# Patient Record
Sex: FEMALE | Race: WHITE
[De-identification: names, ages, dates, MRNs, and addresses within clinical notes are randomized per-mention and may not be internally consistent; named-entity substitution may affect disease eponyms.]

---

## 2021-04-08 ENCOUNTER — HOSPITAL ENCOUNTER (EMERGENCY)
Dept: HOSPITAL 50 - VM.ED | Age: 66
Discharge: HOME | End: 2021-04-08
Payer: MEDICARE

## 2021-04-08 VITALS — DIASTOLIC BLOOD PRESSURE: 84 MMHG | HEART RATE: 107 BPM | SYSTOLIC BLOOD PRESSURE: 137 MMHG

## 2021-04-08 DIAGNOSIS — Z88.5: ICD-10-CM

## 2021-04-08 DIAGNOSIS — J01.90: Primary | ICD-10-CM

## 2021-04-08 DIAGNOSIS — Z91.013: ICD-10-CM

## 2021-04-08 DIAGNOSIS — Z88.4: ICD-10-CM

## 2021-04-08 DIAGNOSIS — Z88.6: ICD-10-CM

## 2021-04-08 DIAGNOSIS — Z79.899: ICD-10-CM

## 2021-04-08 DIAGNOSIS — Z88.8: ICD-10-CM

## 2021-04-08 DIAGNOSIS — Z20.822: ICD-10-CM

## 2021-04-08 DIAGNOSIS — E66.9: ICD-10-CM

## 2021-04-08 DIAGNOSIS — Z91.048: ICD-10-CM

## 2021-04-08 LAB — SARS-COV-2 RNA RESP QL NAA+PROBE: NEGATIVE

## 2021-04-08 NOTE — EDM.PDOC
ED HPI GENERAL MEDICAL PROBLEM





- General


Chief Complaint: Respiratory Problem


Stated Complaint: Congestion


Time Seen by Provider: 04/08/21 10:54


Source of Information: Reports: Patient





- History of Present Illness


INITIAL COMMENTS - FREE TEXT/NARRATIVE: 





Ronda is a 64 y/o female who was sent to the ER to be seen after she called the 

[facility] and advised them that she has not had any sense of taste or smell

since last Saturday. She has had congestion and a runny nose along wiht a mild 

cough and a sore throat. Over the weekend she did have some abdominal upset and 

diarrhea, but none since then. Also feels a bit chilled and thinks her temp is 

rising. This AM she got a bad headache and eye pressure.





- Related Data


                                    Allergies











Allergy/AdvReac Type Severity Reaction Status Date / Time


 


aloe vera [From Vagisil] Allergy  Other Verified 04/01/21 10:50


 


benzocaine [From Vagisil] Allergy  Other Verified 04/01/21 10:50


 


hydrocodone Allergy  Cannot Verified 04/01/21 10:50





   Remember  


 


ibuprofen [From Motrin] Allergy  Nausea Verified 04/01/21 10:50


 


lactose Allergy  Other Verified 04/01/21 10:50


 


mineral oil [From Vagisil] Allergy  Other Verified 04/01/21 10:50


 


oxycodone Allergy  Other Verified 04/01/21 10:50


 


resorcinol [From Vagisil] Allergy  Other Verified 04/01/21 10:50


 


starch [From Vagisil] Allergy  Other Verified 04/01/21 10:50


 


vitamin E (d-alpha Allergy  Other Verified 04/01/21 10:50





tocopherol)     





[From Vagisil]     


 


vitamins A and D Allergy  Other Verified 04/01/21 10:50





[From Vagisil]     


 


Envirnmental Allergy  Itching Uncoded 04/01/21 10:50


 


green peppers Allergy  Other Uncoded 04/01/21 10:50











Home Meds: 


                                    Home Meds





Acetaminophen [Tylenol Extra Strength] 1 - 2 tab PO Q4H PRN 02/28/17 [History]


Naproxen Sodium [Aleve] 1 - 2 tab PO BID 02/28/17 [History]


Propylene Glycol [Lubricant Eye Drop] 1 drop EYEBOTH Q2H PRN 02/28/17 [History]


Tetrahydroz/Dext 70/Peg 400/Pv [Visine Advanced Eye Drop] 1 - 2 drop EYEBOTH BID

PRN 02/28/17 [History]


Biotin 5 mg PO DAILY 03/02/21 [History]


Cinnamon Bark 1 gm PO DAILY 03/02/21 [History]


Collagenase Clostridium Hist. [Collagenase] 1 each PO DAILY 03/02/21 [History]


Garlic 1 each PO DAILY 03/02/21 [History]


Ginkgo Biloba 40 mg PO DAILY 03/02/21 [History]


Glucosam/Chond/Collagen/Hyalur [Glucosamine Chondroitin] 1 each PO TID 03/02/21 

[History]


Multivitamin 1 each PO DAILY 03/02/21 [History]


Omega-3 Fatty Acids [Maxepa] 500 mg PO DAILY 03/02/21 [History]


Turmeric 1 gm PO DAILY 03/02/21 [History]


Vitamin B Complex 1 each PO DAILY 03/02/21 [History]


Boswellia John Extract 2 gm MC DAILY 03/10/21 [History]


Cefuroxime Axetil [Ceftin] 500 mg PO BID #20 tablet 04/08/21 [Rx]











Past Medical History


HEENT History: Reports: Hard of Hearing, Impaired Vision, Other (See Below)


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


Cardiovascular History: Reports: High Cholesterol


Respiratory History: Reports: None


Gastrointestinal History: Reports: Colon Polyp


Genitourinary History: Reports: Other (See Below)


Other Genitourinary History: vulvar pain


OB/GYN History: Reports: Other (See Below)


Other OB/GYN History: Vulvar pain


Musculoskeletal History: Reports: Osteoarthritis, Other (See Below)


Other Musculoskeletal History: Carpal tunnel syndrome.  Plantar fascial 

fibromatosis.  Dupuytren's disease of palm


Neurological History: Reports: Head Trauma, Other (See Below)


Other Neuro History: Postherpetic neuralgia


Psychiatric History: Reports: None


Endocrine/Metabolic History: Reports: Obesity/BMI 30+


Hematologic History: Reports: Anemia


Oncologic (Cancer) History: Reports: Uterine


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


Dermatologic History: Reports: Other (See Below)





- Past Surgical History


HEENT Surgical History: Reports: Oral Surgery


Cardiovascular Surgical History: Reports: None


Respiratory Surgical History: Reports: None


GI Surgical History: Reports: Colonoscopy


Female  Surgical History: Reports: Hysterectomy, Salpingo-Oophorectomy, Other 

(See Below)


Other Female  Surgeries/Procedures: lap node dissection robotic omentectomy, 

pelvic and zelda aortic lymphadenectomy, hysteroscopy


Musculoskeletal Surgical History: Reports: Carpal Tunnel





Social & Family History





- Family History


Cardiac: Reports: CAD, Hypertension


Other Cardiac Family History: CAD-mother.  HTN-mother, sister


Respiratory: Reports: Asthma, Sleep Apnea


Other Respiratory Family Hisory: asthma-sister.  apnea-mother, sister


Musculoskeletal: Reports: Arthritis, Back pain, Chronic


Other Musculoskeletal Family History: arthritis-mother, sister, brother.  

chronic back pain-father


Neurological: Reports: Migraines, MS


Other Neurological Family History: migraines and MS-sister


Endocrine/Metabolic: Reports: Diabetes, Type I, Obesity/MBI 30+


Other Endocrine/Metabolic Family History: diabetes-mother, sister.  obesity-

mother, sister


Oncologic: Reports: Skin, Uterine


Other Oncologic Family History: skin and uterine - sister





- Caffeine Use


Caffeine Use: Reports: None





- Living Situation & Occupation


Living situation: Reports:  (With 3 stepchildren from the marriage.), 

with Spouse


Occupation: Retired (Patient worked as a  for the [employer].)





ED ROS GENERAL





- Review of Systems


Review Of Systems: See Below


Constitutional: Reports: Chills, Fatigue


HEENT: Reports: No Symptoms, Sinus Problem, Throat Pain, Other (Eye pressure)


Respiratory: Reports: Cough


Cardiovascular: Reports: No Symptoms


Endocrine: Reports: No Symptoms


GI/Abdominal: Reports: Abdominal Pain, Diarrhea (x1), Decreased Appetite


: Reports: No Symptoms


Musculoskeletal: Reports: No Symptoms


Skin: Reports: No Symptoms


Neurological: Reports: Headache


Psychiatric: Reports: No Symptoms


Hematologic/Lymphatic: Reports: No Symptoms


Immunologic: Reports: No Symptoms





ED EXAM, GENERAL





- Physical Exam


Exam: See Below


General Appearance: Alert, WD/WN, No Apparent Distress (Elderly female. 

Pleasant.)


Eye Exam: Bilateral Eye: EOMI


Ears: Normal External Exam, Normal Canal, Hearing Grossly Normal


Ear Exam: Bilateral Ear: TM Dull (note air fluid levels)


Nose: Normal Inspection, Other (nasal mucosa irritated)


Throat/Mouth: Normal Inspection, Normal Lips, Normal Teeth, Normal Voice


Head: Atraumatic, Normocephalic


Neck: Normal Inspection, Supple, Non-Tender


Respiratory/Chest: No Respiratory Distress, Lungs Clear, Chest Non-Tender


Cardiovascular: Normal Peripheral Pulses, Regular Rate, Rhythm, No Murmur


GI/Abdominal: Normal Bowel Sounds, Soft, Non-Tender, No Mass


 (Female) Exam: Deferred


Rectal (Female) Exam: Deferred


Back Exam: Normal Inspection


Extremities: Normal Inspection, Normal Range of Motion, Normal Capillary Refill


Neurological: Alert, Oriented, CN II-XII Intact, Normal Cognition, Normal Gait


Psychiatric: Normal Affect, Normal Mood


Skin Exam: Warm, Dry, Intact, Normal Color


Lymphatic: No Adenopathy





Course





- Vital Signs


Text/Narrative:: 





1054 COVID test was done per RN prior to CNP seeing the patient.





1200 COVID results neg. Inf A/B neg/neg. Patient seen by the CNP. Results r

eviewed with patient. Will treat for a Sinusitis with Ceftin and OTC meds. 

Questions answered. Reassurance given. She was given discharge instructions and 

left the ER in stable condition.





- Orders/Labs/Meds


Labs: 


                                Laboratory Tests











  04/08/21 Range/Units





  10:57 


 


Influenza Type A RNA  Negative  (NEGATIVE)  


 


Influenza Type B RNA  Negative  (NEGATIVE)  


 


SARS-CoV-2 RNA (MARK)  Negative  (NEGATIVE)  














Departure





- Departure


Time of Disposition: 11:53


Disposition: Home, Self-Care 01


Condition: Good


Clinical Impression: 


Sinusitis


Qualifiers:


 Sinusitis location: unspecified location Chronicity: acute Recurrence: not 

specified as recurrent Qualified Code(s): J01.90 - Acute sinusitis, unspecified








- Discharge Information


*PRESCRIPTION DRUG MONITORING PROGRAM REVIEWED*: Not Applicable


*COPY OF PRESCRIPTION DRUG MONITORING REPORT IN PATIENT ARNIE: Not Applicable


Prescriptions: 


Cefuroxime Axetil [Ceftin] 500 mg PO BID #20 tablet


Instructions:  Sinusitis, Adult, Probiotics


Referrals: 


Ralf Young PA-C [Primary Care Provider] - 


Forms:  ED Department Discharge





- Assessment/Plan


Assessment:: 





1)Acute Sinusitis


2)Negative COVID test


Plan: 








-Ceftin 500mg oral twice daily for 10 days #20(Rx)





-Use Pseudoephedrine as needed for congestion





-Use an antihistamine such as Claritin, Zyrtec, or Allegra to help dry up your 

runny nose as needed





-Try Flonase or Nasocort nasal spray





-Saline nasal rinses with Neti-pot or Michelle-Med rinse bottle





-Rest





-Stay well hydrated





-Return to the clinic if symptoms not better not or come back to the ER for any 

concerns

## 2021-08-09 ENCOUNTER — HOSPITAL ENCOUNTER (EMERGENCY)
Dept: HOSPITAL 50 - VM.ED | Age: 66
Discharge: HOME | End: 2021-08-09
Payer: MEDICARE

## 2021-08-09 VITALS — HEART RATE: 76 BPM | SYSTOLIC BLOOD PRESSURE: 120 MMHG | DIASTOLIC BLOOD PRESSURE: 64 MMHG

## 2021-08-09 DIAGNOSIS — Z91.011: ICD-10-CM

## 2021-08-09 DIAGNOSIS — Z91.018: ICD-10-CM
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
## 2021-08-09 DIAGNOSIS — S99.921A: Primary | ICD-10-CM

## 2021-08-09 DIAGNOSIS — Z88.6: ICD-10-CM

## 2021-08-09 DIAGNOSIS — Z88.5: ICD-10-CM

## 2021-08-09 DIAGNOSIS — Z91.048: ICD-10-CM

## 2021-08-09 DIAGNOSIS — E66.9: ICD-10-CM

## 2021-08-09 DIAGNOSIS — M19.90: ICD-10-CM

## 2021-08-09 DIAGNOSIS — Z88.8: ICD-10-CM

## 2021-08-09 DIAGNOSIS — Z79.899: ICD-10-CM

## 2021-08-09 NOTE — EDM.PDOC
ED HPI GENERAL MEDICAL PROBLEM





- General


Stated Complaint: right great toe injury


Time Seen by Provider: 08/09/21 13:45


Source of Information: Reports: Patient


History Limitations: Reports: No Limitations





- History of Present Illness


INITIAL COMMENTS - FREE TEXT/NARRATIVE: 





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 

was reaching for it.  the door struck her right great toe and hit her toenail. 

Some bleeding ensued.  Pain and throbbing in the distal right great toe 

continued.  Unable to wear a shoe due to pain, Is limping due to it.  Last 

tetnaus as 8 years ago.  No other injury. not on a blood thinner


Onset: Today


Duration: Hour(s):


Location: Reports: Lower Extremity, Right


Severity: Moderate


  ** Right Toe-Hailux


Pain Score (Numeric/FACES): 3





- Related Data


                                    Allergies











Allergy/AdvReac Type Severity Reaction Status Date / Time


 


aloe vera [From Vagisil] Allergy  Other Verified 08/09/21 14:19


 


benzocaine [From Vagisil] Allergy  Other Verified 08/09/21 14:19


 


hydrocodone Allergy  Cannot Verified 08/09/21 14:19





   Remember  


 


ibuprofen [From Motrin] Allergy  Nausea Verified 08/09/21 14:19


 


lactose Allergy  Other Verified 08/09/21 14:19


 


mineral oil [From Vagisil] Allergy  Other Verified 08/09/21 14:19


 


oxycodone Allergy  Other Verified 08/09/21 14:19


 


resorcinol [From Vagisil] Allergy  Other Verified 08/09/21 14:19


 


starch [From Vagisil] Allergy  Other Verified 08/09/21 14:19


 


vitamin E (d-alpha Allergy  Other Verified 08/09/21 14:19





tocopherol)     





[From Vagisil]     


 


vitamins A and D Allergy  Other Verified 08/09/21 14:19





[From Vagisil]     


 


Envirnmental Allergy  Itching Uncoded 08/09/21 14:19


 


green peppers Allergy  Other Uncoded 08/09/21 14:19











Home Meds: 


                                    Home Meds





Acetaminophen [Tylenol Extra Strength] 1 - 2 tab PO Q4H PRN 02/28/17 [History]


Naproxen Sodium [Aleve] 1 - 2 tab PO BID 02/28/17 [History]


Propylene Glycol [Lubricant Eye Drop] 1 drop EYEBOTH Q2H PRN 02/28/17 [History]


Tetrahydroz/Dext 70/Peg 400/Pv [Visine Advanced Eye Drop] 1 - 2 drop EYEBOTH BID

PRN 02/28/17 [History]


Biotin 5 mg PO DAILY 03/02/21 [History]


Cinnamon Bark 1 gm PO DAILY 03/02/21 [History]


Collagenase Clostridium Hist. [Collagenase] 1 each PO DAILY 03/02/21 [History]


Garlic 1 each PO DAILY 03/02/21 [History]


Ginkgo Biloba 40 mg PO DAILY 03/02/21 [History]


Glucosam/Chond/Collagen/Hyalur [Glucosamine Chondroitin] 1 each PO TID 03/02/21 

[History]


Multivitamin 1 each PO DAILY 03/02/21 [History]


Omega-3 Fatty Acids [Maxepa] 500 mg PO DAILY 03/02/21 [History]


Turmeric 1 gm PO DAILY 03/02/21 [History]


Vitamin B Complex 1 each PO DAILY 03/02/21 [History]


Boswellia John Extract 2 gm MC DAILY 03/10/21 [History]


Acetaminophen/Codeine [Tylenol with Codeine No.3 300MG/30MG] 1 tab PO Q4H PRN 

#10 tab 08/09/21 [Rx]











Past Medical History


HEENT History: Reports: Hard of Hearing, Impaired Vision, Other (See Below)


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


Cardiovascular History: Reports: High Cholesterol


Respiratory History: Reports: None


Gastrointestinal History: Reports: Colon Polyp


Genitourinary History: Reports: Other (See Below)


Other Genitourinary History: vulvar pain


OB/GYN History: Reports: Other (See Below)


Other OB/GYN History: Vulvar pain


Musculoskeletal History: Reports: Osteoarthritis, Other (See Below)


Other Musculoskeletal History: Carpal tunnel syndrome.  Plantar fascial 

fibromatosis.  Dupuytren's disease of palm


Neurological History: Reports: Head Trauma, Other (See Below)


Other Neuro History: Postherpetic neuralgia


Psychiatric History: Reports: None


Endocrine/Metabolic History: Reports: Obesity/BMI 30+


Hematologic History: Reports: Anemia


Oncologic (Cancer) History: Reports: Uterine


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


Dermatologic History: Reports: Other (See Below)





- Past Surgical History


HEENT Surgical History: Reports: Oral Surgery


Cardiovascular Surgical History: Reports: None


Respiratory Surgical History: Reports: None


GI Surgical History: Reports: Colonoscopy


Female  Surgical History: Reports: Hysterectomy, Salpingo-Oophorectomy, Other 

(See Below)


Other Female  Surgeries/Procedures: lap node dissection robotic omentectomy, 

pelvic and zelda aortic lymphadenectomy, hysteroscopy


Musculoskeletal Surgical History: Reports: Carpal Tunnel





Social & Family History





- Family History


Cardiac: Reports: CAD, Hypertension


Other Cardiac Family History: CAD-mother.  HTN-mother, sister


Respiratory: Reports: Asthma, Sleep Apnea


Other Respiratory Family Hisory: asthma-sister.  apnea-mother, sister


Musculoskeletal: Reports: Arthritis, Back pain, Chronic


Other Musculoskeletal Family History: arthritis-mother, sister, brother.  

chronic back pain-father


Neurological: Reports: Migraines, MS


Other Neurological Family History: migraines and MS-sister


Endocrine/Metabolic: Reports: Diabetes, Type I, Obesity/MBI 30+


Other Endocrine/Metabolic Family History: diabetes-mother, sister.  obesity-

mother, sister


Oncologic: Reports: Skin, Uterine


Other Oncologic Family History: skin and uterine - sister





- Caffeine Use


Caffeine Use: Reports: None





- Recreational Drug Use


Recreational Drug Use: No


Drug Use in Last 12 Months: No





- Living Situation & Occupation


Living situation: Reports:  (With 3 stepchildren from the marriage.), 

with Spouse


Occupation: Retired (Patient worked as a  for the "[employer].)





Review of Systems





- Review of Systems


Review Of Systems: See Below


Constitutional: Reports: No Symptoms


Eyes: Reports: No Symptoms


Ears: Reports: No Symptoms


Nose: Reports: No Symptoms


Mouth/Throat: Reports: No Symptoms


Respiratory: Reports: No Symptoms


Cardiovascular: Reports: No Symptoms


GI/Abdominal: Reports: No Symptoms


Genitourinary: Reports: No Symptoms


Musculoskeletal: Reports: Foot Pain (right great toe)


Skin: Reports: Other (bleeding from rigth great toe)





ED EXAM, GENERAL





- Physical Exam


Exam: See Below


Exam Limited By: No Limitations


General Appearance: Alert, WD/WN, No Apparent Distress


Eye Exam: Bilateral Eye: EOMI, Normal Inspection, PERRL


Ears: Normal External Exam, Normal Canal, Hearing Grossly Normal


Nose: Normal Inspection, Normal Mucosa, No Blood


Throat/Mouth: Normal Inspection, Normal Lips, Normal Teeth


Head: Atraumatic


Neck: Normal Inspection


Respiratory/Chest: No Respiratory Distress, Lungs Clear, Normal Breath Sounds, 

Chest Non-Tender


Cardiovascular: Normal Peripheral Pulses, Regular Rate, Rhythm, No Murmur


Extremities: Other (right great toe with pain to palpation of the distal 

phalanyx.  nail lifted distally with some serous oozing.  no bleeding, sensation

 intact, capillary refill normal < 2 sec.  fungal nail noted.  no pain MTP or 

rest of foot. )





Course





- Vital Signs


Last Recorded V/S: 


                                Last Vital Signs











Temp  36.9 C   08/09/21 13:30


 


Pulse  76   08/09/21 13:30


 


Resp  16   08/09/21 13:30


 


BP  120/64   08/09/21 13:30


 


Pulse Ox  97   08/09/21 13:30














- Radiology Interpretation


Free Text/Narrative:: 





preliminary read, no acute on x-ray by author





- Re-Assessments/Exams


Free Text/Narrative Re-Assessment/Exam: 





08/09/21 14:00


tetanus is up to date.  Will get an x-ray of the toe to rule out open fracture. 

 Nothing to suture, will clean and apply dermabond.  post op shoe given


08/09/21 15:26


no fracture seen.  discussed limited pain medications. prefers tylenol with 

codeine.  return for signs of infection





Departure





- Departure


Time of Disposition: 15:24


Disposition: Home, Self-Care 01


Clinical Impression: 


 Nail bed injury, Injury of toe








- Discharge Information


*PRESCRIPTION DRUG MONITORING PROGRAM REVIEWED*: Not Applicable


*COPY OF PRESCRIPTION DRUG MONITORING REPORT IN PATIENT ARNIE: Not Applicable


Prescriptions: 


Acetaminophen/Codeine [Tylenol with Codeine No.3 300MG/30MG] 1 tab PO Q4H PRN 

#10 tab


 PRN Reason: Pain


Additional Instructions: 


Use the post op shoe for ambulation as long as needed so you are not limping.  

Ice, elevate the area.  Use over the counter pain medication as needed.  The 

glue will peel off on it's own.  Do not apply antibiotic ointment.  Use the pain

medication as needed if tylenol does not help





Sepsis Event Note (ED)





- Focused Exam


Vital Signs: 


                                   Vital Signs











  Temp Pulse Resp BP Pulse Ox


 


 08/09/21 13:30  36.9 C  76  16  120/64  97

## 2021-08-09 NOTE — CR
______________________________________________________________________________   

  

[id number] RAD/RAD Toes Right  

EXAM: 3 VIEWS RIGHT 1ST DIGIT.  

   

 INDICATION: TRAUMA  

   

 COMPARISON: None.  

   

 DISCUSSION: No fracture, dislocation or other acute osseous abnormality.  

 Moderate degenerative changes of the 1st metatarsophalangeal joint.  

   

 IMPRESSION:  

 1.  Moderate degenerative changes of the 1st metatarsal phalangeal joint.  

   

 Electronically signed by Finesse Valenzuela MD on 8/9/2021 3:15 PM  

   

  

Finesse Valenzuela DO                 

 08/09/21 [id number]    

  

Thank you for allowing us to participate in the care of your patient.

## 2021-12-16 ENCOUNTER — HOSPITAL ENCOUNTER (OUTPATIENT)
Dept: HOSPITAL 50 - VM.SDS | Age: 66
Discharge: HOME | End: 2021-12-16
Attending: FAMILY MEDICINE
Payer: MEDICARE

## 2021-12-16 VITALS — SYSTOLIC BLOOD PRESSURE: 144 MMHG | HEART RATE: 60 BPM | DIASTOLIC BLOOD PRESSURE: 71 MMHG

## 2021-12-16 DIAGNOSIS — K29.90: Primary | ICD-10-CM

## 2021-12-16 DIAGNOSIS — G57.00: ICD-10-CM

## 2021-12-16 DIAGNOSIS — K31.89: ICD-10-CM

## 2021-12-16 DIAGNOSIS — E66.9: ICD-10-CM

## 2021-12-16 DIAGNOSIS — E78.5: ICD-10-CM

## 2021-12-16 DIAGNOSIS — Z88.5: ICD-10-CM

## 2021-12-16 DIAGNOSIS — K25.9: ICD-10-CM

## 2021-12-16 DIAGNOSIS — Z79.899: ICD-10-CM

## 2021-12-16 DIAGNOSIS — Z91.018: ICD-10-CM

## 2021-12-16 DIAGNOSIS — Z88.8: ICD-10-CM

## 2021-12-16 DIAGNOSIS — Z91.048: ICD-10-CM

## 2021-12-17 NOTE — OR
PREOPERATIVE DIAGNOSIS:  Dysphagia in the lower esophageal area with pills and

more recently with certain foods.  There is concern for possible esophageal

stricture.  The patient does use naproxen 1 tab about every 8 hours for her

piriformis syndrome.  This is the patient's first esophagogastroduodenoscopy.

There is a positive family history of dysphagia in brother who requires dilation

procedures.

 

POSTOPERATIVE DIAGNOSES:

1. Mild gastritis, most prominent in the fundus and antrum areas.  Both areas

    reveal some tiny superficial ulcerations with mildly friable mucosal

    lining.  Cold biopsies taken from antrum for path and Helicobacter pylori.

2. Mild duodenitis.  Cold biopsy x2 bites taken.

3. No significant esophagitis, hiatal hernia, strictures, or rings appreciated

    to the lower esophagus or GE junction.

 

PROCEDURE:  Esophagogastroduodenoscopy with cold biopsy x2 sites (antrum and

duodenum).

 

SURGEON:  Pierre Wolf M.D.

 

ANESTHESIA:  Monitored anesthesia care.

 

DESCRIPTION OF PROCEDURE:  Ronda is a 66-year-old female who was brought to the

endoscope suite after discussion of risks and benefits (including but not

limited to reaction to medication, bleeding, infection, aspiration,

perforation).  Informed consent was obtained for monitored anesthesia care and

esophagogastroduodenoscopy along with possible biopsy and/or dilatation.

 

Pre-procedure exam including oral cavity was unremarkable.  IV, oxygen, and

monitors were placed.  Patient was placed in the left lateral position and

sedation was administered.  A bite block was placed gently and scope lightly

lubricated and passed through the bite block and over the tongue.  Hypopharynx

and vocal cords were visualized and unremarkable. Scope was passed through the

cricopharynx and into the esophagus.  The scope was then passed through the

distal esophagus and the GE junction was visualized and photographed.  The GE

junction was unremarkable.  The scope was advanced into the stomach and gastric

lake was suctioned.  Pylorus was identified and intubated and then the scope was

advanced to the third portion of the duodenum.  The second and third portions of

the duodenum were unremarkable except for the transition between the duodenal

bulb and second portion, which revealed some mild duodenitis.  Biopsies were

taken from this area as well as the duodenal bulb, which revealed some mild

duodenitis as well.  The scope was brought back into the stomach, and the

pylorus and antrum were remarkable for mild antritis with some tiny superficial

antral ulcerations.  Biopsies for H pylori and path were obtained from the

antrum, including one of these tiny ulceration sites.  The scope was then

retroflexed to visualize the angularis, fundus, body, and cardia.  The fundus

revealed some mild gastritis with some tiny petechial areas of hemorrhage,

although not acutely bleeding.  The stomach was desufflated of air and then the

scope was slowly withdrawn.  Esophagus was closely visualized during withdrawal

all the way to the posterior pharynx.  The esophagus was otherwise unremarkable.

The patient tolerated the procedure well and went to recovery in stable

condition.  The patient was monitored until at baseline status.  Findings and

discharge instructions were reviewed and the patient was discharged in good

condition.

 

COMPLICATIONS:  None.

 

TOTAL TIME:  9 minutes.

 

ESTIMATED BLOOD LOSS:  1 to 2 mL.

 

RECOMMENDATIONS/FOLLOWUP:  We will send letter with results from the biopsy

sites.  In the meantime, I would like the patient to go on PPI for GI protection

given her chronic naproxen use.  Prescription was done for omeprazole 20 mg

daily.  She can follow up with PCP if symptoms not improving.  If dysphagia

ongoing, could consider workup for possible achalasia.

 

I would like to kindly thank Ralf Young for this referral.

 

 

DMB:  12/16/2021 15:03:33  MODL:  12/16/2021 15:51:54

Job #:  [id number]

## 2022-01-24 ENCOUNTER — HOSPITAL ENCOUNTER (EMERGENCY)
Dept: HOSPITAL 50 - VM.ED | Age: 67
Discharge: HOME | End: 2022-01-24
Payer: MEDICARE

## 2022-01-24 VITALS — DIASTOLIC BLOOD PRESSURE: 83 MMHG | HEART RATE: 123 BPM | SYSTOLIC BLOOD PRESSURE: 131 MMHG

## 2022-01-24 DIAGNOSIS — J22: Primary | ICD-10-CM

## 2022-01-24 DIAGNOSIS — Z91.011: ICD-10-CM

## 2022-01-24 DIAGNOSIS — E66.9: ICD-10-CM

## 2022-01-24 DIAGNOSIS — Z91.048: ICD-10-CM

## 2022-01-24 DIAGNOSIS — E78.00: ICD-10-CM

## 2022-01-24 DIAGNOSIS — Z88.8: ICD-10-CM

## 2022-01-24 DIAGNOSIS — Z88.5: ICD-10-CM

## 2022-01-24 DIAGNOSIS — Z20.822: ICD-10-CM

## 2022-01-24 LAB
RSV RNA UPPER RESP QL NAA+PROBE: NEGATIVE
SARS-COV-2 RNA RESP QL NAA+PROBE: NEGATIVE

## 2022-05-19 ENCOUNTER — HOSPITAL ENCOUNTER (OUTPATIENT)
Dept: HOSPITAL 50 - VM.SDS | Age: 67
Discharge: HOME | End: 2022-05-19
Attending: FAMILY MEDICINE
Payer: MEDICARE

## 2022-05-19 VITALS — SYSTOLIC BLOOD PRESSURE: 119 MMHG | HEART RATE: 50 BPM | DIASTOLIC BLOOD PRESSURE: 59 MMHG

## 2022-05-19 DIAGNOSIS — Z79.899: ICD-10-CM

## 2022-05-19 DIAGNOSIS — K64.9: ICD-10-CM

## 2022-05-19 DIAGNOSIS — H91.90: ICD-10-CM

## 2022-05-19 DIAGNOSIS — D12.0: ICD-10-CM

## 2022-05-19 DIAGNOSIS — Z98.890: ICD-10-CM

## 2022-05-19 DIAGNOSIS — E78.00: ICD-10-CM

## 2022-05-19 DIAGNOSIS — Z12.11: Primary | ICD-10-CM

## 2022-05-19 DIAGNOSIS — E66.9: ICD-10-CM
